# Patient Record
(demographics unavailable — no encounter records)

---

## 2025-03-21 NOTE — PLAN
[FreeTextEntry1] : Well appearing  female is here for Prolia injection; she is doing well on current management for osteoporosis. She has no acute gyn complaints.  - Prolia given in right upper arm; patient tolerated well - patient is due for her routine CPE with her PCP, recommend bringing lab results for BUN/Creatinine/Ca+ and vitamin d at next visit. pt denies history of abnormal labs.  - continue vitamin d. ca+ in diet and weight bearing exercises.  RTO in 6 months for Prolia injection and PRN.

## 2025-03-21 NOTE — HISTORY OF PRESENT ILLNESS
[postmenopausal] : postmenopausal [Y] : Patient is sexually active [Monogamous (Male Partner)] : is monogamous with a male partner [Mammogramdate] : 5/2024 [BreastSonogramDate] : 5/2024 [PapSmeardate] : 01/2025 [BoneDensityDate] : 12/23 [TextBox_37] : Lumbar Spine  T score -2.5 ; [facility]  [ColonoscopyDate] : 2020 [TextBox_43] : 10 year follow up [TextBox_102] : Age at Menarche 15yr [LMPDate] : 50 years old [PGHxTotal] : 3 [[facility]] : 2 [[facility] CenterxLiving] : 2 [PGHxABSpont] : 1 [FreeTextEntry1] :  x 1; NVD x 1

## 2025-03-21 NOTE — PHYSICAL EXAM
[Appropriately responsive] : appropriately responsive [Alert] : alert [No Acute Distress] : no acute distress [Oriented x3] : oriented x3 [FreeTextEntry5] : Respirations even, unlabored. no dyspnea.  [FreeTextEntry8] : Ambulatory with steady gait [FreeTextEntry1] : deferred

## 2025-03-21 NOTE — COUNSELING
[Medication Management] : medication management [FreeTextEntry2] : Recommend vitamin D; adequate calcium intake, weight bearing exercises Fall precautions and fracture prevention discussed.